# Patient Record
Sex: MALE | URBAN - METROPOLITAN AREA
[De-identification: names, ages, dates, MRNs, and addresses within clinical notes are randomized per-mention and may not be internally consistent; named-entity substitution may affect disease eponyms.]

---

## 2023-09-15 ENCOUNTER — APPOINTMENT (OUTPATIENT)
Dept: URBAN - METROPOLITAN AREA CLINIC 207 | Age: 68
Setting detail: DERMATOLOGY
End: 2023-09-17

## 2023-09-15 DIAGNOSIS — D485 NEOPLASM OF UNCERTAIN BEHAVIOR OF SKIN: ICD-10-CM

## 2023-09-15 DIAGNOSIS — L57.0 ACTINIC KERATOSIS: ICD-10-CM

## 2023-09-15 PROBLEM — D48.5 NEOPLASM OF UNCERTAIN BEHAVIOR OF SKIN: Status: ACTIVE | Noted: 2023-09-15

## 2023-09-15 PROCEDURE — 17003 DESTRUCT PREMALG LES 2-14: CPT

## 2023-09-15 PROCEDURE — 11102 TANGNTL BX SKIN SINGLE LES: CPT

## 2023-09-15 PROCEDURE — OTHER LIQUID NITROGEN: OTHER

## 2023-09-15 PROCEDURE — OTHER BIOPSY BY SHAVE METHOD: OTHER

## 2023-09-15 PROCEDURE — OTHER MIPS QUALITY: OTHER

## 2023-09-15 PROCEDURE — OTHER COUNSELING: OTHER

## 2023-09-15 PROCEDURE — 17000 DESTRUCT PREMALG LESION: CPT | Mod: 59

## 2023-09-15 PROCEDURE — 11103 TANGNTL BX SKIN EA SEP/ADDL: CPT

## 2023-09-15 ASSESSMENT — LOCATION DETAILED DESCRIPTION DERM
LOCATION DETAILED: LEFT MEDIAL MALAR CHEEK
LOCATION DETAILED: LEFT PROXIMAL DORSAL FOREARM
LOCATION DETAILED: RIGHT MID TEMPLE
LOCATION DETAILED: MID-FRONTAL SCALP
LOCATION DETAILED: LEFT CENTRAL TEMPLE
LOCATION DETAILED: LEFT INFERIOR CENTRAL MALAR CHEEK
LOCATION DETAILED: RIGHT SUPERIOR LATERAL MALAR CHEEK
LOCATION DETAILED: LEFT LATERAL MALAR CHEEK
LOCATION DETAILED: LEFT DISTAL DORSAL FOREARM
LOCATION DETAILED: LEFT INFERIOR MEDIAL MALAR CHEEK
LOCATION DETAILED: NASAL ROOT
LOCATION DETAILED: RIGHT MEDIAL ZYGOMA
LOCATION DETAILED: SUPERIOR MID FOREHEAD

## 2023-09-15 ASSESSMENT — LOCATION SIMPLE DESCRIPTION DERM
LOCATION SIMPLE: ANTERIOR SCALP
LOCATION SIMPLE: SUPERIOR FOREHEAD
LOCATION SIMPLE: RIGHT CHEEK
LOCATION SIMPLE: RIGHT TEMPLE
LOCATION SIMPLE: LEFT TEMPLE
LOCATION SIMPLE: LEFT FOREARM
LOCATION SIMPLE: NOSE
LOCATION SIMPLE: LEFT CHEEK
LOCATION SIMPLE: RIGHT ZYGOMA

## 2023-09-15 ASSESSMENT — LOCATION ZONE DERM
LOCATION ZONE: ARM
LOCATION ZONE: NOSE
LOCATION ZONE: SCALP
LOCATION ZONE: FACE

## 2023-09-15 ASSESSMENT — PAIN INTENSITY VAS: HOW INTENSE IS YOUR PAIN 0 BEING NO PAIN, 10 BEING THE MOST SEVERE PAIN POSSIBLE?: NO PAIN

## 2023-09-15 NOTE — PROCEDURE: BIOPSY BY SHAVE METHOD
Post-Care Instructions: Wash gently with a mild soap, pat dry and apply either Vaseline/Aquaphor daily. Avoid using Neosporin. Keep biopsy sites covered with ointment and a bandage to avoid letting a scab form.  Covered, moist wounds will heal faster. Continue to keep biopsy site covered with ointment and a bandage for 7-10 days (or until completely healed). Biopsies on the lower leg will take longer to heal. It is normal to have some mild discomfort after a biopsy. That should resolve within a few days. You can take OTC Tylenol or Advil if needed. Follow directions on bottle. If pain worsens, call the office \\n\\nIf you experience bleeding, apply firm and steady pressure for 15 minutes. Do not stop to check to see if the wound is still bleeding.  If the wound continues to bleed after 15-20 minutes, call the office.\\n\\nSigns and symptoms of infections are worsening pain, warmth, drainages with pus, yellow or adair crusts, or fever/chills. If you develop any of these symptoms, please call the office. \\n\\nAfter the wound has healed, it is important to protect are from sun to avoid red or brown pigmentation.  Applying sunscreen (SPF 30 or higher) and/or wearing sun protected clothing will help the scar fade faster.  \\n\\n[facility] phone number: [phone]

## 2023-09-15 NOTE — PROCEDURE: BIOPSY BY SHAVE METHOD
Post-Care Instructions: Wash gently with a mild soap, pat dry and apply either Vaseline/Aquaphor daily. Avoid using Neosporin. Keep biopsy sites covered with ointment and a bandage to avoid letting a scab form.  Covered, moist wounds will heal faster. Continue to keep biopsy site covered with ointment and a bandage for 7-10 days (or until completely healed). Biopsies on the lower leg will take longer to heal. It is normal to have some mild discomfort after a biopsy. That should resolve within a few days. You can take OTC Tylenol or Advil if needed. Follow directions on bottle. If pain worsens, call the office \\n\\nIf you experience bleeding, apply firm and steady pressure for 15 minutes. Do not stop to check to see if the wound is still bleeding.  If the wound continues to bleed after 15-20 minutes, call the office.\\n\\nSigns and symptoms of infections are worsening pain, warmth, drainages with pus, yellow or adair crusts, or fever/chills. If you develop any of these symptoms, please call the office. \\n\\nAfter the wound has healed, it is important to protect are from sun to avoid red or brown pigmentation.  Applying sunscreen (SPF 30 or higher) and/or wearing sun protected clothing will help the scar fade faster.  \\n\\n[facility] phone number: [phone] Post-Care Instructions: Wash gently with a mild soap, pat dry and apply either Vaseline/Aquaphor daily. Avoid using Neosporin. Keep biopsy sites covered with ointment and a bandage to avoid letting a scab form.  Covered, moist wounds will heal faster. Continue to keep biopsy site covered with ointment and a bandage for 7-10 days (or until completely healed). Biopsies on the lower leg will take longer to heal. It is normal to have some mild discomfort after a biopsy. That should resolve within a few days. You can take OTC Tylenol or Advil if needed. Follow directions on bottle. If pain worsens, call the office \\n\\nIf you experience bleeding, apply firm and steady pressure for 15 minutes. Do not stop to check to see if the wound is still bleeding.  If the wound continues to bleed after 15-20 minutes, call the office.\\n\\nSigns and symptoms of infections are worsening pain, warmth, drainages with pus, yellow or adair crusts, or fever/chills. If you develop any of these symptoms, please call the office. \\n\\nAfter the wound has healed, it is important to protect are from sun to avoid red or brown pigmentation.  Applying sunscreen (SPF 30 or higher) and/or wearing sun protected clothing will help the scar fade faster.  \\n\\n[facility] phone number: [phone]

## 2023-10-31 ENCOUNTER — APPOINTMENT (OUTPATIENT)
Dept: URBAN - METROPOLITAN AREA CLINIC 206 | Age: 68
Setting detail: DERMATOLOGY
End: 2023-11-01

## 2023-10-31 PROBLEM — C44.311 BASAL CELL CARCINOMA OF SKIN OF NOSE: Status: ACTIVE | Noted: 2023-10-31

## 2023-10-31 PROBLEM — C44.42 SQUAMOUS CELL CARCINOMA OF SKIN OF SCALP AND NECK: Status: ACTIVE | Noted: 2023-10-31

## 2023-10-31 PROCEDURE — OTHER COUNSELING: OTHER

## 2023-10-31 PROCEDURE — 17311 MOHS 1 STAGE H/N/HF/G: CPT

## 2023-10-31 PROCEDURE — OTHER MOHS SURGERY: OTHER

## 2023-10-31 PROCEDURE — 13151 CMPLX RPR E/N/E/L 1.1-2.5 CM: CPT

## 2023-10-31 PROCEDURE — 99212 OFFICE O/P EST SF 10 MIN: CPT | Mod: 25

## 2023-10-31 PROCEDURE — OTHER MIPS QUALITY: OTHER

## 2023-10-31 NOTE — PROCEDURE: COUNSELING
Detail Level: Detailed
Patient Specific Counseling (Will Not Stick From Patient To Patient): Patient unable to identify site and photo from biopsy does not allow for clear determination of location\\nWill continue to monitor for any recurrence \\nSite at mid frontal scalp where it was stamped on the chart with flat scar, no evidence of scc in this location at this time, this appears to be most likely location but patient does not feel this is where the biopsy was taken from \\nDiscussed these findings with patient and he agreed to monitor site for recurrence and any new lesions on scalp

## 2023-10-31 NOTE — PROCEDURE: MOHS SURGERY
minimum assist (75% patients effort) Spiral Flap Text: The defect edges were debeveled with a #15 scalpel blade. Given the location of the defect, shape of the defect and the proximity to free margins a spiral flap was deemed most appropriate. Using a sterile surgical marker, an appropriate rotation flap was drawn incorporating the defect and placing the expected incisions within the relaxed skin tension lines where possible. The area thus outlined was incised deep to adipose tissue with a #15 scalpel blade. The skin margins were undermined to an appropriate distance in all directions utilizing iris scissors. Following this, the designed flap was carried over into the primary defect and sutured into place.

## 2023-11-02 ENCOUNTER — OFFICE VISIT (OUTPATIENT)
Dept: URBAN - METROPOLITAN AREA CLINIC 92 | Facility: CLINIC | Age: 68
End: 2023-11-02
Payer: MEDICARE

## 2023-11-02 VITALS
BODY MASS INDEX: 21.47 KG/M2 | WEIGHT: 185.6 LBS | HEIGHT: 78 IN | TEMPERATURE: 96.9 F | HEART RATE: 86 BPM | DIASTOLIC BLOOD PRESSURE: 58 MMHG | SYSTOLIC BLOOD PRESSURE: 90 MMHG

## 2023-11-02 DIAGNOSIS — Z12.11 COLON CANCER SCREENING: ICD-10-CM

## 2023-11-02 PROCEDURE — 99202 OFFICE O/P NEW SF 15 MIN: CPT

## 2023-11-02 RX ORDER — FLUDROCORTISONE ACETATE 0.1 MG/1
TAKE 1 TABLET BY MOUTH EVERY DAY TABLET ORAL
Qty: 90 EACH | Refills: 0 | Status: DISCONTINUED | COMMUNITY

## 2023-11-02 RX ORDER — VENLAFAXINE HYDROCHLORIDE 75 MG/1
TAKE 1 CAPSULE BY MOUTH EVERY DAY CAPSULE, EXTENDED RELEASE ORAL
Qty: 90 EACH | Refills: 0 | Status: DISCONTINUED | COMMUNITY

## 2023-11-02 RX ORDER — SODIUM, POTASSIUM,MAG SULFATES 17.5-3.13G
177ML SOLUTION, RECONSTITUTED, ORAL ORAL
Qty: 1 KIT | Refills: 0 | OUTPATIENT

## 2023-11-02 RX ORDER — FLUDROCORTISONE ACETATE 0.1 MG/1
TAKE 1 TABLET BY MOUTH EVERY DAY TABLET ORAL
Qty: 90 EACH | Refills: 0 | Status: ACTIVE | COMMUNITY

## 2023-11-02 RX ORDER — VENLAFAXINE HYDROCHLORIDE 75 MG/1
TAKE 1 CAPSULE BY MOUTH EVERY DAY CAPSULE, EXTENDED RELEASE ORAL
Qty: 90 EACH | Refills: 0 | Status: ACTIVE | COMMUNITY

## 2023-11-02 RX ORDER — BETHANECHOL CHLORIDE 25 MG/1
1 TABLET 1 HOUR BEFORE OR 2 HOURS AFTER MEALS TABLET ORAL THREE TIMES A DAY
Qty: 90 | Status: ACTIVE | COMMUNITY
Start: 2023-11-02

## 2023-11-02 RX ORDER — ALLOPURINOL 100 MG/1
1 TABLET TABLET ORAL ONCE A DAY
Qty: 30 | Status: ACTIVE | COMMUNITY
Start: 2023-11-02 | End: 2023-12-01

## 2023-11-02 NOTE — HPI-TODAY'S VISIT:
Patient is a 68 year old male who presents for a colon cancer screening. Never had a colonoscopy. There is no family history of colon polyps or colon cancer. Patient denies a change in bowel habits, appetite, and weight. Patient denies abdominal pain, constipation,diarrhea, hematochezia, or melena. Patient has a daily BM. Denies upper GI issues. Takes Aleve daily for compression fracture. Patient denies any cardiac, lung, or kidney issues. No pacemaker, No blood thinner, No home O2.  Patient had an excision of basal cell carcinoma from his forehead two days ago.

## 2023-11-07 ENCOUNTER — APPOINTMENT (OUTPATIENT)
Dept: URBAN - METROPOLITAN AREA CLINIC 206 | Age: 68
Setting detail: DERMATOLOGY
End: 2023-11-07

## 2023-11-07 DIAGNOSIS — Z48.02 ENCOUNTER FOR REMOVAL OF SUTURES: ICD-10-CM

## 2023-11-07 PROCEDURE — OTHER MIPS QUALITY: OTHER

## 2023-11-07 PROCEDURE — OTHER SUTURE REMOVAL (GLOBAL PERIOD): OTHER

## 2023-11-07 PROCEDURE — 99024 POSTOP FOLLOW-UP VISIT: CPT

## 2023-11-07 ASSESSMENT — LOCATION DETAILED DESCRIPTION DERM: LOCATION DETAILED: NASAL ROOT

## 2023-11-07 ASSESSMENT — LOCATION SIMPLE DESCRIPTION DERM: LOCATION SIMPLE: NOSE

## 2023-11-07 ASSESSMENT — LOCATION ZONE DERM: LOCATION ZONE: NOSE

## 2023-12-11 ENCOUNTER — OFFICE VISIT (OUTPATIENT)
Dept: URBAN - METROPOLITAN AREA SURGERY CENTER 16 | Facility: SURGERY CENTER | Age: 68
End: 2023-12-11

## 2024-01-08 ENCOUNTER — DASHBOARD ENCOUNTERS (OUTPATIENT)
Age: 69
End: 2024-01-08

## 2024-01-11 ENCOUNTER — OFFICE VISIT (OUTPATIENT)
Dept: URBAN - METROPOLITAN AREA CLINIC 92 | Facility: CLINIC | Age: 69
End: 2024-01-11

## 2024-01-11 RX ORDER — VENLAFAXINE HYDROCHLORIDE 75 MG/1
TAKE 1 CAPSULE BY MOUTH EVERY DAY CAPSULE, EXTENDED RELEASE ORAL
Qty: 90 EACH | Refills: 0 | COMMUNITY

## 2024-01-11 RX ORDER — BETHANECHOL CHLORIDE 25 MG/1
1 TABLET 1 HOUR BEFORE OR 2 HOURS AFTER MEALS TABLET ORAL THREE TIMES A DAY
Qty: 90 | COMMUNITY
Start: 2023-11-02

## 2024-01-11 RX ORDER — SODIUM, POTASSIUM,MAG SULFATES 17.5-3.13G
177ML SOLUTION, RECONSTITUTED, ORAL ORAL
Qty: 1 KIT | Refills: 0 | COMMUNITY

## 2024-01-11 RX ORDER — FLUDROCORTISONE ACETATE 0.1 MG/1
TAKE 1 TABLET BY MOUTH EVERY DAY TABLET ORAL
Qty: 90 EACH | Refills: 0 | COMMUNITY

## 2025-01-22 NOTE — PROCEDURE: SUTURE REMOVAL (GLOBAL PERIOD)
Detail Level: Detailed
Add 11549 Cpt? (Important Note: In 2017 The Use Of 42150 Is Being Tracked By Cms To Determine Future Global Period Reimbursement For Global Periods): yes
TIMUR Fountain